# Patient Record
Sex: FEMALE | Race: WHITE
[De-identification: names, ages, dates, MRNs, and addresses within clinical notes are randomized per-mention and may not be internally consistent; named-entity substitution may affect disease eponyms.]

---

## 2017-10-18 ENCOUNTER — HOSPITAL ENCOUNTER (EMERGENCY)
Dept: HOSPITAL 46 - ED | Age: 57
Discharge: HOME | End: 2017-10-18
Payer: MEDICARE

## 2017-10-18 VITALS — HEIGHT: 64 IN | WEIGHT: 175 LBS | BODY MASS INDEX: 29.88 KG/M2

## 2017-10-18 DIAGNOSIS — Z88.8: ICD-10-CM

## 2017-10-18 DIAGNOSIS — Z98.51: ICD-10-CM

## 2017-10-18 DIAGNOSIS — Z86.73: ICD-10-CM

## 2017-10-18 DIAGNOSIS — F80.81: Primary | ICD-10-CM

## 2017-10-18 DIAGNOSIS — F41.9: ICD-10-CM

## 2017-10-18 DIAGNOSIS — Z90.89: ICD-10-CM

## 2017-10-18 DIAGNOSIS — Z79.899: ICD-10-CM

## 2017-10-18 DIAGNOSIS — Z79.01: ICD-10-CM

## 2017-10-18 DIAGNOSIS — F32.9: ICD-10-CM

## 2017-10-18 DIAGNOSIS — Z90.49: ICD-10-CM

## 2017-10-18 DIAGNOSIS — J45.909: ICD-10-CM

## 2017-10-18 DIAGNOSIS — Z86.718: ICD-10-CM

## 2017-10-18 DIAGNOSIS — Z87.891: ICD-10-CM

## 2017-10-18 DIAGNOSIS — E78.5: ICD-10-CM

## 2017-10-18 NOTE — XMS
[facility]
  Created on: 2017
 
 Sarahi Sandoval
 External Reference #: [id number]
 : [birth year]
 Sex: Female
 
 Demographics
 
 
+-----------------------+---------------------------+
| Address               | [address]              |
|                       | [place]  [address] |
+-----------------------+---------------------------+
| Preferred Language    | Unknown                   |
+-----------------------+---------------------------+
| Marital Status        | Unknown                   |
+-----------------------+---------------------------+
| [employer] Affiliation | Unknown                   |
+-----------------------+---------------------------+
| Race                  | Unknown                   |
+-----------------------+---------------------------+
| Ethnic Group          | Unknown                   |
+-----------------------+---------------------------+
 
 
 Author
 
 
+--------------+----------------------+
| Author       | [facility]    |
+--------------+----------------------+
| Organization | [facility]    |
+--------------+----------------------+
| Address      | [address] |
|              | [place]  [address] |
+--------------+----------------------+
| Phone        | [phone]        |
+--------------+----------------------+
 
 
 
 Care Team Providers
 
 
+-----------------------+-------------+-------------+
| Care Team Member Name | Role        | Phone       |
+-----------------------+-------------+-------------+
 Unavailable | Unavailable |
+-----------------------+-------------+-------------+
 
 
 
 PROBLEMS
 
 
+------------+------------+----------+-----------+----------+------------+-----------+
 
| Type       | Condition  | ICD9-CM  | [id number]  | Onset    | Condition  | SNOMED    |
|            |            | Code     | Code      | Dates    | Status     | Code      |
+------------+------------+----------+-----------+----------+------------+-----------+
| Problem    | CVA LATE   | 438.9    |           |          | Active     | [id number] |
|            | EFFECT -   |          |           |          |            |           |
|            | SEGUELAE   |          |           |          |            |           |
+------------+------------+----------+-----------+----------+------------+-----------+
| Problem    | LONG-TERM  | V58.61   |           |          | Active     | [id number] |
|            | USE        |          |           |          |            |           |
|            | ANTICOAGUL |          |           |          |            |           |
+------------+------------+----------+-----------+----------+------------+-----------+
| Problem    | HX         | V12.54   |           |          | Active     | [id number] |
|            | TIA/STROKE |          |           |          |            |           |
|            |  W/O RESID |          |           |          |            |           |
+------------+------------+----------+-----------+----------+------------+-----------+
| Assessment | Degenerati |          | M47.816   | 03 May,  | Active     | [id number]  |
|            | ve joint   |          |           | 2017     |            |           |
|            | disease    |          |           |          |            |           |
|            | (DJD) of   |          |           |          |            |           |
|            | lumbar     |          |           |          |            |           |
|            | spine      |          |           |          |            |           |
+------------+------------+----------+-----------+----------+------------+-----------+
| Assessment | Sciatica   | M54.30   |           | 03 May,  | Active     | [id number]  |
|            |            |          |           | 2017     |            |           |
+------------+------------+----------+-----------+----------+------------+-----------+
| Problem    | Insect     | W57.XXXA |           |          | Active     | [id number] |
|            | bite,      |          |           |          |            |           |
|            | initial    |          |           |          |            |           |
|            | encounter  |          |           |          |            |           |
+------------+------------+----------+-----------+----------+------------+-----------+
| Problem    | Cellulitis |          | L03.119   |          | Active     | [id number] |
|            |  of        |          |           |          |            |           |
|            | unspecifie |          |           |          |            |           |
|            | d part of  |          |           |          |            |           |
|            | limb       |          |           |          |            |           |
+------------+------------+----------+-----------+----------+------------+-----------+
| Problem    | Breast     | V76.10   |           |          | Active     | [id number] |
|            | screening  |          |           |          |            |           |
+------------+------------+----------+-----------+----------+------------+-----------+
| Problem    | Shoulder   | 726.10   |           |          | Active     | [id number] |
|            | bursitis   |          |           |          |            |           |
+------------+------------+----------+-----------+----------+------------+-----------+
| Problem    | Arterioscl | I25.10   |           |          | Active     | [id number]  |
|            | erotic     |          |           |          |            |           |
|            | cardiovasc |          |           |          |            |           |
|            | ular       |          |           |          |            |           |
|            | disease    |          |           |          |            |           |
+------------+------------+----------+-----------+----------+------------+-----------+
| Problem    | Hyperlipem |          | E78.5     |          | Active     | [id number]  |
|            | ia         |          |           |          |            |           |
+------------+------------+----------+-----------+----------+------------+-----------+
 
 
 
 ALLERGIES
 
 
+-----------+----------+--------------+--------------+--------+
| Substance | Reaction | Event Type   | Date         | Status |
+-----------+----------+--------------+--------------+--------+
 
| Dilantin  | rash     | Drug Allergy | 03 May, 2017 | Active |
+-----------+----------+--------------+--------------+--------+
| Depakote  | seizures | Drug Allergy | 03 May, 2017 | Active |
+-----------+----------+--------------+--------------+--------+
| Benadryl  | wires    | Drug Allergy | 03 May, 2017 | Active |
+-----------+----------+--------------+--------------+--------+
 
 
 
 SOCIAL HISTORY
 No smoking Hx information available
 
 PLAN OF CARE
 
 
 VITAL SIGNS
 
 
+--------------------------+-------------------------+------------+
| Height                   | 64 in                   | 2017 |
+--------------------------+-------------------------+------------+
| Weight                   | 175.8 lbs               | 2017 |
+--------------------------+-------------------------+------------+
| BMI                      | 30.17 kg/m2             | 2017 |
+--------------------------+-------------------------+------------+
| Temperature              | 98.5 degrees Fahrenheit | 2017 |
+--------------------------+-------------------------+------------+
| Heart Rate               | 72 /min                 | 2017 |
+--------------------------+-------------------------+------------+
| Blood pressure systolic  | 115 mm Hg               | 2017 |
+--------------------------+-------------------------+------------+
| Blood pressure diastolic | 68 mm Hg                | 2017 |
+--------------------------+-------------------------+------------+
 
 
 
 MEDICATIONS
 
 
+----------+----------+----------+----------+----------+----------+----------+--------+
| Medicati | Instruct | Dosage   | Frequenc | Start    | End Date | Duration | Status |
| on       | ions     |          | y        | Date     |          |          |        |
+----------+----------+----------+----------+----------+----------+----------+--------+
| Fluoxeti | Orally   | TAKE 2   | 24h      | 21 Oct,  |          |          | Active |
| ne HCl   | Once a   | CAPSULE  |          | 2016     |          |          |        |
| 20 MG    | day      | BY MOUTH |          |          |          |          |        |
|          |          |  EVERY   |          |          |          |          |        |
|          |          | MORNING  |          |          |          |          |        |
+----------+----------+----------+----------+----------+----------+----------+--------+
| Quetiapi |          | TAKE ONE |          |          |          | 30       | Active |
| ne       |          |  TABLET  |          |          |          |          |        |
| Fumarate |          | BY MOUTH |          |          |          |          |        |
|  200     |          |  AT      |          |          |          |          |        |
|          |          | BEDTIME  |          |          |          |          |        |
+----------+----------+----------+----------+----------+----------+----------+--------+
| Warfarin |          | TAKE 1   |          |          |          |          | Active |
|  Sodium  |          | TABLET   |          |          |          |          |        |
| 3        |          | BY MOUTH |          |          |          |          |        |
|          |          |  ON      |          |          |          |          |        |
|          |          | SATURDAY |          |          |          |          |        |
 
|          |          |  THRU    |          |          |          |          |        |
|          |          | THURSDAY |          |          |          |          |        |
|          |          | , THEN   |          |          |          |          |        |
|          |          | TAKE THE |          |          |          |          |        |
|          |          |  4 MG ON |          |          |          |          |        |
|          |          |   |          |          |          |          |        |
+----------+----------+----------+----------+----------+----------+----------+--------+
| Topirama |          | TAKE 3   |          |          |          | 30       | Active |
| te 50    |          | TABLETS  |          |          |          |          |        |
|          |          | BY MOUTH |          |          |          |          |        |
|          |          |  TWICE   |          |          |          |          |        |
|          |          | DAILY    |          |          |          |          |        |
+----------+----------+----------+----------+----------+----------+----------+--------+
| Seroquel | Orally   | 1 tablet | 24h      |          |          | 30       | Active |
|  200     | Once a   |  at      |          |          |          |          |        |
|          | day      | bedtime  |          |          |          |          |        |
+----------+----------+----------+----------+----------+----------+----------+--------+
| Gabapent | po tid   | 1 tab    | 8h       |          |          | 30       | Active |
| in 300   |          |          |          |          |          |          |        |
+----------+----------+----------+----------+----------+----------+----------+--------+
| KCL 20   |          | 1 tablet | 24h      |          |          | 90       | Active |
+----------+----------+----------+----------+----------+----------+----------+--------+
| Pravasta |          | TAKE 1   |          |          |          | 30       | Active |
| tin      |          | TABLET   |          |          |          |          |        |
| Sodium   |          | BY MOUTH |          |          |          |          |        |
| 80       |          |  EVERY   |          |          |          |          |        |
|          |          | DAY      |          |          |          |          |        |
+----------+----------+----------+----------+----------+----------+----------+--------+
| Combiven | inh qid  | 1 puffs  | 6h       |          |          | 30       | Active |
| t        |          |          |          |          |          |          |        |
| Inhaler  |          |          |          |          |          |          |        |
| 0        |          |          |          |          |          |          |        |
+----------+----------+----------+----------+----------+----------+----------+--------+
 
 
 
 RESULTS
 No Results
 
 PROCEDURES
 
 
+--------------------+--------------+-------------------+-----------+
| Procedure          | Date Ordered | Related Diagnosis | Body Site |
+--------------------+--------------+-------------------+-----------+
| Office Visit, Est  | May 03, 2017 |                   |           |
| Pt., Level 3       |              |                   |           |
+--------------------+--------------+-------------------+-----------+
 
 
 
 IMMUNIZATIONS
 No Known Immunizations

## 2018-07-16 ENCOUNTER — HOSPITAL ENCOUNTER (EMERGENCY)
Dept: HOSPITAL 46 - ED | Age: 58
Discharge: HOME | End: 2018-07-16
Payer: MEDICARE

## 2018-07-16 VITALS — WEIGHT: 170 LBS | HEIGHT: 64 IN | BODY MASS INDEX: 29.02 KG/M2

## 2018-07-16 DIAGNOSIS — Z88.8: ICD-10-CM

## 2018-07-16 DIAGNOSIS — I82.4Z2: ICD-10-CM

## 2018-07-16 DIAGNOSIS — I82.412: Primary | ICD-10-CM

## 2018-07-16 DIAGNOSIS — Z79.899: ICD-10-CM

## 2018-07-16 DIAGNOSIS — Z87.891: ICD-10-CM
